# Patient Record
Sex: FEMALE | Race: BLACK OR AFRICAN AMERICAN | Employment: UNEMPLOYED | URBAN - METROPOLITAN AREA
[De-identification: names, ages, dates, MRNs, and addresses within clinical notes are randomized per-mention and may not be internally consistent; named-entity substitution may affect disease eponyms.]

---
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
## 2025-05-22 ENCOUNTER — HOSPITAL ENCOUNTER (EMERGENCY)
Age: 20
Discharge: HOME | End: 2025-05-22
Attending: EMERGENCY MEDICINE
Payer: MEDICAID

## 2025-05-22 VITALS
HEART RATE: 63 BPM | RESPIRATION RATE: 16 BRPM | OXYGEN SATURATION: 100 % | WEIGHT: 118 LBS | SYSTOLIC BLOOD PRESSURE: 123 MMHG | TEMPERATURE: 97.5 F | BODY MASS INDEX: 21.71 KG/M2 | DIASTOLIC BLOOD PRESSURE: 77 MMHG | HEIGHT: 62 IN

## 2025-05-22 DIAGNOSIS — F12.90 MARIJUANA USE: ICD-10-CM

## 2025-05-22 DIAGNOSIS — R11.2 NAUSEA AND VOMITING, UNSPECIFIED VOMITING TYPE: Primary | ICD-10-CM

## 2025-05-22 LAB
ALBUMIN SERPL-MCNC: 5 G/DL (ref 3.4–5)
ALBUMIN/GLOB SERPL: 1.6 {RATIO} (ref 1.1–2.2)
ALP SERPL-CCNC: 78 U/L (ref 40–129)
ALT SERPL-CCNC: 16 U/L (ref 10–40)
AMPHET UR QL SCN: NEGATIVE
ANION GAP SERPL CALCULATED.3IONS-SCNC: 18 MMOL/L (ref 9–17)
AST SERPL-CCNC: 25 U/L (ref 15–37)
B-HCG SERPL EIA 3RD IS-ACNC: <1 MIU/ML
BARBITURATES UR QL SCN: NEGATIVE
BASOPHILS # BLD: 0.06 K/UL
BASOPHILS NFR BLD: 1 % (ref 0–1)
BENZODIAZ UR QL: NEGATIVE
BILIRUB SERPL-MCNC: 1.2 MG/DL (ref 0–1)
BILIRUB UR QL STRIP: NEGATIVE
BUN SERPL-MCNC: 17 MG/DL (ref 7–20)
CALCIUM SERPL-MCNC: 10.3 MG/DL (ref 8.3–10.6)
CANNABINOIDS UR QL SCN: POSITIVE
CHARACTER UR: ABNORMAL
CHLORIDE SERPL-SCNC: 104 MMOL/L (ref 99–110)
CLARITY UR: CLEAR
CO2 SERPL-SCNC: 22 MMOL/L (ref 21–32)
COCAINE UR QL SCN: NEGATIVE
COLOR UR: YELLOW
CREAT SERPL-MCNC: 0.9 MG/DL (ref 0.6–1.1)
EOSINOPHIL # BLD: 0.02 K/UL
EOSINOPHILS RELATIVE PERCENT: 0 % (ref 0–3)
EPI CELLS #/AREA URNS HPF: 7 /HPF
ERYTHROCYTE [DISTWIDTH] IN BLOOD BY AUTOMATED COUNT: 12.9 % (ref 11.7–14.9)
FENTANYL UR QL: NEGATIVE
GFR, ESTIMATED: 84 ML/MIN/1.73M2
GLUCOSE SERPL-MCNC: 91 MG/DL (ref 74–99)
GLUCOSE UR STRIP-MCNC: NEGATIVE MG/DL
HCT VFR BLD AUTO: 45.8 % (ref 37–47)
HGB BLD-MCNC: 14.4 G/DL (ref 12.5–16)
HGB UR QL STRIP.AUTO: ABNORMAL
IMM GRANULOCYTES # BLD AUTO: 0.01 K/UL
IMM GRANULOCYTES NFR BLD: 0 %
KETONES UR STRIP-MCNC: >80 MG/DL
LEUKOCYTE ESTERASE UR QL STRIP: NEGATIVE
LIPASE SERPL-CCNC: 14 U/L (ref 13–60)
LYMPHOCYTES NFR BLD: 1.07 K/UL
LYMPHOCYTES RELATIVE PERCENT: 16 % (ref 24–44)
MCH RBC QN AUTO: 27.7 PG (ref 27–31)
MCHC RBC AUTO-ENTMCNC: 31.4 G/DL (ref 32–36)
MCV RBC AUTO: 88.1 FL (ref 78–100)
MONOCYTES NFR BLD: 0.25 K/UL
MONOCYTES NFR BLD: 4 % (ref 0–5)
MUCOUS THREADS URNS QL MICRO: ABNORMAL
NEUTROPHILS NFR BLD: 79 % (ref 36–66)
NEUTS SEG NFR BLD: 5.39 K/UL
NITRITE UR QL STRIP: NEGATIVE
OPIATES UR QL SCN: NEGATIVE
OXYCODONE UR QL SCN: NEGATIVE
PH UR STRIP: 6 [PH] (ref 5–8)
PLATELET # BLD AUTO: 307 K/UL (ref 140–440)
PMV BLD AUTO: 10.2 FL (ref 7.5–11.1)
POTASSIUM SERPL-SCNC: 3.9 MMOL/L (ref 3.5–5.1)
PROT SERPL-MCNC: 8.1 G/DL (ref 6.4–8.2)
PROT UR STRIP-MCNC: ABNORMAL MG/DL
RBC # BLD AUTO: 5.2 M/UL (ref 4.2–5.4)
RBC #/AREA URNS HPF: 4 /HPF (ref 0–2)
SODIUM SERPL-SCNC: 143 MMOL/L (ref 136–145)
SP GR UR STRIP: >1.03 (ref 1–1.03)
TEST INFORMATION: ABNORMAL
UROBILINOGEN UR STRIP-ACNC: 0.2 EU/DL (ref 0–1)
WBC #/AREA URNS HPF: 1 /HPF (ref 0–5)
WBC OTHER # BLD: 6.8 K/UL (ref 4–10.5)

## 2025-05-22 PROCEDURE — 80053 COMPREHEN METABOLIC PANEL: CPT

## 2025-05-22 PROCEDURE — 96361 HYDRATE IV INFUSION ADD-ON: CPT

## 2025-05-22 PROCEDURE — 6360000002 HC RX W HCPCS: Performed by: EMERGENCY MEDICINE

## 2025-05-22 PROCEDURE — 80307 DRUG TEST PRSMV CHEM ANLYZR: CPT

## 2025-05-22 PROCEDURE — 83690 ASSAY OF LIPASE: CPT

## 2025-05-22 PROCEDURE — 96374 THER/PROPH/DIAG INJ IV PUSH: CPT

## 2025-05-22 PROCEDURE — 36415 COLL VENOUS BLD VENIPUNCTURE: CPT

## 2025-05-22 PROCEDURE — 2580000003 HC RX 258: Performed by: EMERGENCY MEDICINE

## 2025-05-22 PROCEDURE — 99284 EMERGENCY DEPT VISIT MOD MDM: CPT

## 2025-05-22 PROCEDURE — 81001 URINALYSIS AUTO W/SCOPE: CPT

## 2025-05-22 PROCEDURE — 85025 COMPLETE CBC W/AUTO DIFF WBC: CPT

## 2025-05-22 PROCEDURE — 84702 CHORIONIC GONADOTROPIN TEST: CPT

## 2025-05-22 RX ORDER — ONDANSETRON 4 MG/1
4 TABLET, ORALLY DISINTEGRATING ORAL 3 TIMES DAILY PRN
Qty: 21 TABLET | Refills: 0 | Status: SHIPPED | OUTPATIENT
Start: 2025-05-22

## 2025-05-22 RX ORDER — ONDANSETRON 2 MG/ML
4 INJECTION INTRAMUSCULAR; INTRAVENOUS EVERY 30 MIN PRN
Status: DISCONTINUED | OUTPATIENT
Start: 2025-05-22 | End: 2025-05-22 | Stop reason: HOSPADM

## 2025-05-22 RX ORDER — CALCIUM CARBONATE 500 MG/1
1 TABLET, CHEWABLE ORAL DAILY
Qty: 30 TABLET | Refills: 0 | Status: SHIPPED | OUTPATIENT
Start: 2025-05-22 | End: 2025-06-21

## 2025-05-22 RX ORDER — 0.9 % SODIUM CHLORIDE 0.9 %
1000 INTRAVENOUS SOLUTION INTRAVENOUS ONCE
Status: COMPLETED | OUTPATIENT
Start: 2025-05-22 | End: 2025-05-22

## 2025-05-22 RX ORDER — DICYCLOMINE HCL 20 MG
20 TABLET ORAL 4 TIMES DAILY
Qty: 40 TABLET | Refills: 0 | Status: SHIPPED | OUTPATIENT
Start: 2025-05-22

## 2025-05-22 RX ORDER — FAMOTIDINE 20 MG/1
20 TABLET, FILM COATED ORAL 2 TIMES DAILY
Qty: 14 TABLET | Refills: 0 | Status: SHIPPED | OUTPATIENT
Start: 2025-05-22

## 2025-05-22 RX ADMIN — ONDANSETRON 4 MG: 2 INJECTION INTRAMUSCULAR; INTRAVENOUS at 19:53

## 2025-05-22 RX ADMIN — SODIUM CHLORIDE 1000 ML: 0.9 INJECTION, SOLUTION INTRAVENOUS at 19:53

## 2025-05-22 ASSESSMENT — ENCOUNTER SYMPTOMS
EYES NEGATIVE: 1
RESPIRATORY NEGATIVE: 1
VOMITING: 1
ALLERGIC/IMMUNOLOGIC NEGATIVE: 1
NAUSEA: 1

## 2025-05-22 ASSESSMENT — PAIN SCALES - GENERAL: PAINLEVEL_OUTOF10: 0

## 2025-05-22 ASSESSMENT — LIFESTYLE VARIABLES
HOW MANY STANDARD DRINKS CONTAINING ALCOHOL DO YOU HAVE ON A TYPICAL DAY: PATIENT DOES NOT DRINK
HOW OFTEN DO YOU HAVE A DRINK CONTAINING ALCOHOL: NEVER

## 2025-05-22 ASSESSMENT — PAIN - FUNCTIONAL ASSESSMENT: PAIN_FUNCTIONAL_ASSESSMENT: 0-10

## 2025-05-22 NOTE — ED NOTES
Peripheral IV placed in the right ac using a 20 gauge catheter.   Site flushed with normal saline, and no complications noted. Saline locked.   IV Dressing clean, dry, and intact.

## 2025-05-22 NOTE — ED TRIAGE NOTES
Pt states she has been throwing up all day, and the last time she threw up she states there were black flecks in it.

## 2025-05-22 NOTE — ED PROVIDER NOTES
Epithelial Cells, UA 7 /HPF    Mucus, UA MANY (A) None    Other Observations UA Culture not indicated. (A) NOT REQ.        Radiographs (if obtained):  [] The following radiograph was interpreted by myself in the absence of a radiologist:  [x] Radiologist's Report Reviewed:      EKG (if obtained): (All EKG's are interpreted by myself in the absence of a cardiologist)    MDM:    Patient with complaint of nausea vomiting.  Again patient states she has had about 8 episodes of nausea vomiting today.  Nonbloody although she did have some black specks in the last emesis.  Denies vomiting blood.  No fevers cough chest pain shortness of breath or complaints denies being pregnant she is on menstrual cycle.  No abdominal pain no diarrhea no abdominal surgeries.  Does admit to using marijuana uses daily.  Last use this morning.  She states use marijuana did not help warm showers did not help she tells me that she has never had marijuana induced vomiting before.  No travel or sick contacts food poisoning on arrival she appears well vital signs are stable she is not currently vomiting she has no abdominal pain no Lin sign over [place]'s point no rebound guarding rigidity breathsounds are clear she has no sore throat no headache otherwise well-appearing.  Due to her symptoms I did check labs, urine, drug screen, pregnancy test, given fluids, Zofran otherwise she appears well I do not think she needs imaging.  Patient rechecked, she is resting comfortably she states she had some vomiting here I do not see any active vomiting.  But she states she did throw up once here.  I did offer to give her more medication versus send her home with medication and return precautions she has no abdominal pain no fevers vitals are stable workup negative, labs otherwise negative just marijuana positive.  Did explain this could be viral could be food poisoning but also could be marijuana induced vomiting.  She at this time is electing to go home

## 2025-05-23 ENCOUNTER — TELEMEDICINE ON DEMAND (OUTPATIENT)
Age: 20
End: 2025-05-23
Payer: MEDICAID

## 2025-05-23 DIAGNOSIS — A60.09 HERPES SIMPLEX OF FEMALE GENITALIA: Primary | ICD-10-CM

## 2025-05-23 PROCEDURE — 99203 OFFICE O/P NEW LOW 30 MIN: CPT | Performed by: NURSE PRACTITIONER

## 2025-05-23 RX ORDER — VALACYCLOVIR HYDROCHLORIDE 1 G/1
1000 TABLET, FILM COATED ORAL DAILY
Qty: 5 TABLET | Refills: 0 | Status: SHIPPED | OUTPATIENT
Start: 2025-05-23 | End: 2025-05-28

## 2025-05-23 NOTE — PROGRESS NOTES
Discharge instructions reviewed- patient and her friend are both asleep, required much encouragement to wake up to leave

## 2025-05-23 NOTE — PATIENT INSTRUCTIONS
We see that you do not have a Primary Care Provider (PCP) listed. A PCP is important for regular checkups, managing health problems, and getting care when you need it.    You can easily find a PCP and schedule an appointment online by clicking this link:  Find a Primary Care Provider    Thank you for choosing [facility]

## 2025-05-23 NOTE — ED NOTES
The following labs were labeled with appropriate pt sticker and tubed to lab:     [x] Blue     [x] Lavender   [] on ice  [x] Green/yellow x2  [] Green/black [] on ice  [] Grey  [] on ice  [] Yellow  [x] Red  [] Pink  [] Type/ Screen  [] ABG  [] VBG    [] COVID-19 swab    [] Rapid  [] PCR  [] Flu swab  [] Peds Viral Panel     [] Urine Sample  [] Fecal Sample  [] Pelvic Cultures  [] Blood Cultures  [] X 2  [] STREP Cultures  [] Wound Cultures

## 2025-05-23 NOTE — PROGRESS NOTES
Jessica Jefferson (:  2005) is a Established patient, here for evaluation of the following:    herpes lesion on genitalia       Assessment & Plan:  Below is the assessment and plan developed based on review of pertinent history, physical exam, labs, studies, and medications.  1. Herpes simplex of female genitalia  -     valACYclovir (VALTREX) 1 g tablet; Take 1 tablet by mouth daily for 5 days, Disp-5 tablet, R-0Normal  Discussed suppression therapy. Transmission prevention. Patient will f/u with ob/gyn.     Return if symptoms worsen or fail to improve.    Subjective:   Patient reports she has genital herpes lesions. She has the pain that usually precedes an outbreak. She has had this in the past. Her ob/gyn usually treats her but she isn't able to get in for an appt.    Mouth Lesions   The current episode started yesterday. The onset was sudden. The problem occurs frequently. The problem has been gradually worsening. The problem is moderate. The symptoms are relieved by one or more prescription drugs. Pertinent negatives include no fever and no mouth sores.     Review of Systems   Constitutional:  Negative for fever.   HENT:  Negative for mouth sores.        Objective:  Patient-Reported Vitals  Patient-Reported Weight: 115  Patient-Reported Height: 5’2           2025     3:21 PM   Patient-Reported Vitals   Patient-Reported Weight 115   Patient-Reported Height 5’2        Physical Exam:  [INSTRUCTIONS:  \"[x]\" Indicates a positive item  \"[]\" Indicates a negative item  -- DELETE ALL ITEMS NOT EXAMINED]    Constitutional: [x] Appears well-developed and well-nourished [x] No apparent distress      [] Abnormal -     Mental status: [x] Alert and awake  [x] Oriented to person/place/time [x] Able to follow commands    [] Abnormal -     Eyes:   EOM    [x]  Normal    [] Abnormal -   Sclera  [x]  Normal    [] Abnormal -          Discharge [x]  None visible   [] Abnormal -     HENT: [x] Normocephalic, atraumatic  []